# Patient Record
Sex: MALE | Race: WHITE
[De-identification: names, ages, dates, MRNs, and addresses within clinical notes are randomized per-mention and may not be internally consistent; named-entity substitution may affect disease eponyms.]

---

## 2018-07-31 ENCOUNTER — HOSPITAL ENCOUNTER (EMERGENCY)
Dept: HOSPITAL 72 - EMR | Age: 23
Discharge: HOME | End: 2018-07-31
Payer: MEDICAID

## 2018-07-31 VITALS — BODY MASS INDEX: 22.5 KG/M2 | WEIGHT: 140 LBS | HEIGHT: 66 IN

## 2018-07-31 VITALS — SYSTOLIC BLOOD PRESSURE: 122 MMHG | DIASTOLIC BLOOD PRESSURE: 71 MMHG
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
## 2018-07-31 DIAGNOSIS — S93.402A: Primary | ICD-10-CM

## 2018-07-31 PROCEDURE — 99283 EMERGENCY DEPT VISIT LOW MDM: CPT

## 2018-07-31 NOTE — EMERGENCY ROOM REPORT
History of Present Illness


General


Chief Complaint:  Lower Extremity Injury


Source:  Patient





Present Illness


HPI


Patient is a 22-year-old male who presented after increased left ankle pain.  

Patient had been skateboarding yesterday and reported injuring his ankle.  The 

patient had initially been able to bear weight however this had worsened and 

patient was having difficulty with ambulation.  No numbness or tingling 

distally.  He denies any fever.  He denies other locations of injury.


Allergies:  


Coded Allergies:  


     No Known Allergies (Unverified , 3/6/16)





Patient History


Past Medical History:  see triage record


Reviewed Nursing Documentation:  PMH: Agreed; PSxH: Agreed





Nursing Documentation-PMH


Past Medical History:  No Stated History





Review of Systems


All Other Systems:  negative except mentioned in HPI





Physical Exam





Vital Signs








  Date Time  Temp Pulse Resp B/P (MAP) Pulse Ox O2 Delivery O2 Flow Rate FiO2


 


7/31/18 09:17 98.3 70 16 124/66 96 Room Air  





 98.2       








General Appearance:  well appearing, no apparent distress, alert, GCS 15


Head:  normocephalic, atraumatic


ENT:  hearing grossly normal, normal voice


Neck:  full range of motion, supple


Respiratory:  no respiratory distress, speaking full sentences


Gastrointestinal:  normal inspection, normal bowel sounds


Musculoskeletal:  normal inspection, no calf tenderness


Neurologic:  normal inspection, alert, oriented x3, responsive, CNs III-XII nml 

as tested, normal gait


Psychiatric:  mood/affect normal


Skin:  no rash, other





Medical Decision Making


Diagnostic Impression:  


 Primary Impression:  


 Left ankle sprain


 Additional Impression:  


 Contusion


ER Course


Patient presented for ankle pain.  Differential diagnosis included was not 

limited to sprain, fracture, dislocation, cellulitis, vascular insufficiency.  X

-ray imaging of the ankle was ordered.  X-ray imaging of the left ankle 3 views 

interpreted by me showed normal bony alignment without fracture.  The patient 

was placed in an Ace wrap and given crutches.  The patient was advised to follow

-up for recheck with his primary care physician.  Patient is advised to 

continue taking NSAIDs.The patient is advised to follow up with  primary care 

doctor in 2-3 days.  Patient is advised to return if any worsening condition or 

if any changes in status that are concerning.





This report is dictated with Dragon transcription software which may 

occasionally lead to discrepancies related to use of this software.





Last Vital Signs








  Date Time  Temp Pulse Resp B/P (MAP) Pulse Ox O2 Delivery O2 Flow Rate FiO2


 


7/31/18 10:16 98.1 71 15 122/71 99 Room Air  





 98.3       








Status:  improved


Disposition:  HOME, SELF-CARE


Condition:  Stable


Scripts


Ibuprofen* (MOTRIN*) 600 Mg Tablet


600 MG ORAL Q8H PRN for For Pain, #30 TAB 0 Refills


   Prov: Jonathan Carmona MD         7/31/18


Referrals:  


NOT CHOSEN [facility]/MD,REFERRING (PCP)


Patient Instructions:  Ankle Sprain











Jonathan Carmona MD Jul 31, 2018 13:18

## 2018-07-31 NOTE — DIAGNOSTIC IMAGING REPORT
Indication: Pain

 

Technique: XRAY Ankle Compl Min 3v L

 

Comparison: None

 

Findings: No acute fracture. Ankle mortise intact on these nonstress views. Mild soft

tissue swelling about the medial malleolus. Imaged hindfoot grossly unremarkable. No

radiopaque foreign body seen.

 

IMPRESSION: 

Mild soft tissue swelling about the medial malleolus. No evidence of acute fracture

or dislocation.

## 2020-10-24 ENCOUNTER — HOSPITAL ENCOUNTER (EMERGENCY)
Dept: HOSPITAL 72 - EMR | Age: 25
Discharge: HOME | End: 2020-10-24
Payer: MEDICAID

## 2020-10-24 VITALS — DIASTOLIC BLOOD PRESSURE: 83 MMHG | SYSTOLIC BLOOD PRESSURE: 138 MMHG

## 2020-10-24 VITALS — WEIGHT: 145 LBS | BODY MASS INDEX: 22.76 KG/M2 | HEIGHT: 67 IN

## 2020-10-24 DIAGNOSIS — H66.92: Primary | ICD-10-CM

## 2020-10-24 DIAGNOSIS — S00.432A: ICD-10-CM
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
## 2020-10-24 PROCEDURE — 70486 CT MAXILLOFACIAL W/O DYE: CPT

## 2020-10-24 PROCEDURE — 99284 EMERGENCY DEPT VISIT MOD MDM: CPT

## 2020-10-24 PROCEDURE — 70450 CT HEAD/BRAIN W/O DYE: CPT

## 2020-10-24 NOTE — DIAGNOSTIC IMAGING REPORT
History: TRAUMA

 

Exam: CT HEAD Without Contrast

Technique more: CTDI is 68.70 mGy and DLP is 1389.30 mGy-cm.

Technique more: One or more of the following dose reduction techniques 

were used: automated exposure control, adjustment of the mA and/or kV 

according to patient size, use of iterative reconstruction technique.

 

Comparison: None available

 

FINDINGS:

 

No intracranial hemorrhage, mass effect or calvarial fracture.  The 

ventricles are within limits and midline.  The visualized paranasal 

sinuses, mastoids and orbits appear within limits.

 

IMPRESSION:

 

No intracranial hemorrhage, mass effect or calvarial fracture.

## 2020-10-24 NOTE — EMERGENCY ROOM REPORT
History of Present Illness


General


Chief Complaint:  Earache


Source:  Patient





Present Illness


HPI


25-year-old male with no symptom past medical history here complaining of 2 

weeks of on and off left ear pain with 2 days of worsening after he was hit in 

the left ear while boxing.  Complains of minimal hearing loss, denies tinnitus, 

bleeding through the ear.  Denies dizziness and vertigo.  Denies sore throat, 

chest pain, shortness of breath, no other associated symptoms.  Has not taken 

medication for symptom relief


Allergies:  


Coded Allergies:  


     No Known Allergies (Unverified , 3/6/16)





COVID-19 Screening


Contact w/high risk pt:  No


Experienced COVID-19 symptoms?:  No


COVID-19 Testing performed PTA:  No





Patient History


Past Medical History:  see triage record


Past Surgical History:  none


Pertinent Family History:  none


Immunizations:  UTD


Reviewed Nursing Documentation:  PMH: Agreed; PSxH: Agreed





Nursing Documentation-PMH


Past Medical History:  No Stated History





Review of Systems


All Other Systems:  negative except mentioned in HPI





Physical Exam





Vital Signs








  Date Time  Temp Pulse Resp B/P (MAP) Pulse Ox O2 Delivery O2 Flow Rate FiO2


 


10/24/20 16:27 98.6 90 19 138/83 (101) 97 Room Air  








Sp02 EP Interpretation:  reviewed, normal


General Appearance:  no apparent distress, alert, GCS 15, non-toxic


Head:  normocephalic, atraumatic


Eyes:  bilateral eye normal inspection, bilateral eye PERRL


ENT:  other - Left TM bulging with minimal bleeding through the canal


Neck:  full range of motion, supple/symm/no masses


Respiratory:  chest non-tender, lungs clear, normal breath sounds, speaking full

sentences


Cardiovascular #1:  regular rate, rhythm, no edema


Gastrointestinal:  normal bowel sounds, non tender, soft, non-distended, no 

guarding, no rebound


Genitourinary:  no CVA tenderness


Musculoskeletal:  back normal


Neurologic:  alert, motor strength/tone normal, oriented x3, sensory intact, 

responsive, speech normal


Psychiatric:  judgement/insight normal, memory normal, mood/affect normal, no 

suicidal/homicidal ideation


Skin:  no rash


Lymphatic:  no adenopathy





Medical Decision Making


PA Attestation


All diagnoses and treatment plans were reviewed and discussed with my 

supervising physician Dr. Hinson


Diagnostic Impression:  


   Primary Impression:  


   Otitis media


   Additional Impression:  


   Contusion of ear


ER Course


25-year-old male with no symptom past medical history here complaining of 2 

weeks of on and off left ear pain with 2 days of worsening after he was hit in 

the left ear while boxing.  Complains of minimal hearing loss, denies tinnitus, 

bleeding through the ear.  Denies dizziness and vertigo.  Denies sore throat, 

chest pain, shortness of breath, no other associated symptoms.  Has not taken 

medication for symptom relief





Ddx considered but are not limited to: Otitis media, otitis externa, 

mastoiditis, TM perforation,











Vital signs: are WNL, pt. is afebrile








 H&PE are most consistent with: Otitis media, ear contusion














ORDERS: Head CT no contrast, facial CT no contrast, Augmentin, Motrin








ED INTERVENTIONS: None required at this time.























DISCHARGE: At this time pt. is stable for d/c to home. Will provide printed 

patient care instructions, and any necessary prescriptions. Care plan and follow

up instructions have been discussed with the patient prior to discharge.  Take 

medication as directed, follow-up with ENT, if worsening symptoms return to 

emergency room


CT/MRI/US Diagnostic Results


CT/MRI/US Diagnostic Results #1:  


   Imaging Test Ordered:  CT head no contrast


   Impression





Comparison: None available





FINDINGS:





No intracranial hemorrhage, mass effect or calvarial fracture. The ventricles 

are within limits and midline. The visualized paranasal sinuses, mastoids and 

orbits appear within limits.





IMPRESSION:





No intracranial hemorrhage, mass effect or calvarial fracture.


CT/MRI/US Diagnostic Results #2:  


   Imaging Test Ordered:  CT facial no contrast


   Impression





FINDINGS:





Soft tissue thickening, opacification of the left external auditory canal may 

represent otitis externa, clinically correlate. Mild partial soft tissue 

opacification of the left middle ear, cannot exclude otitis media. The left 

mastoid is clear. No osseous destruction identified.





Soft tissue lobular density at the right external auditory canal may represent 

cerumen plug, clinically correlate. The right mastoid and middle ear are clear.





The paranasal sinuses are clear. No acute fracture. The orbits appear within 

limits.





IMPRESSION:





Soft tissue thickening, opacification of the left external auditory canal may 

represent otitis externa, clinically correlate. Mild partial soft tissue 

opacification of the left middle ear, cannot exclude otitis media. The left 

mastoid is clear.





Soft tissue lobular density at the right external auditory canal may represent 

cerumen plug, clinically correlate. The right mastoid and middle ear are clear.





No acute fracture.





Last Vital Signs








  Date Time  Temp Pulse Resp B/P (MAP) Pulse Ox O2 Delivery O2 Flow Rate FiO2


 


10/24/20 16:41 98.6  19 138/83 97 Room Air  


 


10/24/20 16:27  90      








Disposition:  HOME, SELF-CARE


Condition:  Stable


Referrals:  


NON PHYSICIAN (PCP)


Patient Instructions:  Contusion, Easy-to-Read, Otitis Media, Adult





Additional Instructions:  


Take medication as directed, follow-up with ENT, if worsening symptoms return to

the emergency room











Denise Langley      Oct 24, 2020 17:27

## 2020-10-24 NOTE — NUR
ED Nurse Note:



pt presents to ED c/o L ear pain x 1 week. states it may be related to him 
going swimming a week ago. pt denies any N/V or dizziness at this time
